# Patient Record
(demographics unavailable — no encounter records)

---

## 2024-10-21 NOTE — HISTORY OF PRESENT ILLNESS
[de-identified] : 10/21/2024: Gil is a pleasant 17-year-old male who presents to the office today with his mother for evaluation of a chronic right elbow injury.  As per the patient's mother the patient injured the elbow jumping off the bed when he was 3 years old.  At that time he was seen at an outside facility and placed into a cast.  He seemed to recover reasonably well after the injury but as time went on and the patient started to grow more they noticed a small deformity of his elbow.  The patient himself does not complain of much pain in the elbow but does have functional limitations, particularly with supination of his forearm.  He does get discomfort from time to time but could do most of what he needs to.  He does not like how the elbow looks as he notices a large bump on the back of the elbow that occasionally bothers him he leans on the elbow.  He did see another orthopedist several years ago we do not recommend any surgery.  He is here today for another opinion.  He has not had any treatment formally in recent years.  The patient denies any fevers, chills, sweats, recent illnesses, numbness, tingling, weakness, or pain elsewhere at this time.

## 2024-10-21 NOTE — PHYSICAL EXAM
[de-identified] : General: Awake, alert, no acute distress, Patient was cooperative and appropriate during the examination.  The patient's weight is normal for height and age.  Walks without an antalgic gait.   Right Elbow Exam: Physical exam of the elbow demonstrates normal skin without signs of skin changes or abnormalities. No erythema, warmth, or joint effusion appreciated.  Gross deformity of the elbow is noted with radial head prominent posteriorly and laterally.  Sensation intact light touch C5-T1 Palpable radial pulse Radial/ulnar/median/axillary/musculocutaneous/AIN/PIN nerves grossly intact  Range of motion: Flexion-Extension 0-145 degrees Pronation-Supination 80 degrees of pronation to 5 degrees of supination with discomfort in terminal supination  Palpation: Not tender to palpation over the lateral epicondyle Not tender palpation over the medial epicondyle Mildly tender to palpation over the radial head which is dislocated posteriorly Not tender to palpation over the olecranon Not tender to palpation over the distal biceps insertion Not tender to palpation over the distal triceps insertion Not tender to palpation over the cubital tunnel  Strength testing: Elbow Flexion 5/5 Elbow Extension 5/5 Pronation 5/5 Supination 5/5  Special Tests: No varus/valgus laxity at 0 and 30 degrees of elbow flexion No pain with resisted wrist/finger extension and forearm supination No pain with resisted wrist/finger flexion and forearm pronation Negative hook test Negative Tinel's over the carpal and cubital tunnels  No oral lesions; no gross abnormalities [de-identified] : X-rays including 3 views of the left elbow were obtained in the office on 10/21/2024 and reviewed with the patient and his mother.  There is no acute fracture.  There is a chronic appearing posterior dislocation of the radial head which is migrated proximally.

## 2024-10-21 NOTE — DISCUSSION/SUMMARY
[de-identified] : Assessment: 17-year-old male with a chronic left posterior radial head dislocation  Plan: I had a long discussion with the patient and his mother today regarding the nature of their diagnosis and treatment plan. We discussed the risks and benefits of no treatment as well as nonoperative and operative treatments.  I reviewed the patient's x-rays today with him and his mother in the office which demonstrate chronic posterior left radial head dislocation which is migrated proximally.  Patient has minimal tenderness palpation over the radial head itself.  He has no tenderness over the forearm or wrist.  He has reasonably well-preserved elbow flexion and extension as well as pronation but lacks supination beyond a near neutral position.  He has no sensations or signs of instability on examination.  There is a noticeable gross deformity about the posterolateral aspect of the elbow.  He does feel this limits him functionally and bothersome to lean on the elbow.  He would like to know if anything could be considered surgically to repair this.  At this time I recommend another opinion with a pediatric orthopedic specialist for possible surgical consultation of his chronic radial head dislocation.  He may resume his normal activities as tolerated.  He may follow-up with me on an as-needed basis.  The patient and his mother verbalized their understanding and agree with the plan.  All questions were answered to their satisfaction.   I, Dr. Ayers, personally performed the evaluation and management (E/M) services for this new patient.  That E/M includes conducting the clinically appropriate initial history &/or exam, assessing all conditions, and establishing the plan of care.  Today, my DEE, was here to observe my evaluation and management service for this patient & follow plan of care established by me going forward.

## 2024-11-04 NOTE — ASSESSMENT
[FreeTextEntry1] : REASON FOR REQUEST: This young man comes today accompanied by his mother regarding the chief complaint of right elbow dislocation of radial head, referred by Dr. Ayers.   HISTORY OF PRESENT ILLNESS: Gil is a 17-year-old young man who is right-hand dominant.  The patient was delivered at 29 weeks' gestation, delivered via  as being a preemie, and was born at birthweight of 2 pounds 5 ounces with a stay in  intensive care unit for approximately three months.  There did not appear to be a congenital dislocation of the elbow at that time.  The patient's mother reports that the only elbow trauma that had been sustained to Gil's elbow was back in , when he was treated nonoperatively for an elbow injury.  The patient's mother had noticed any type of prominence about the elbow, particularly over the radial head.  However, this has become a progressive phenomenon, and at this point, it appears to be slightly cosmetically but also causing pain with limitation of rotation, particularly with supination, as well as with just generalized function of the right upper extremity.  The patient was referred by Dr. Ayers for possible surgical management given the fact he still fits into the category of pediatric orthopedics.   PAST MEDICAL HISTORY:  As above.   PAST SURGICAL HISTORY:  Significant for intestinal perforation with surgical treatment performed on 2007.   ALLERGIES: The child has had no known drug allergy.   MEDICATIONS: The patient does not take any medications.   REVIEW OF SYSTEMS: Today is negative for fever, chills, chest pain, shortness of breath, or rashes.   FAMILY/SOCIAL HISTORY:  The child is in 12th grade.  He has one sibling who is healthy. There are no orthopedic or neurologic conditions that run in the family. The child resides within a tobacco-positive household.   PHYSICAL EXAMINATION: On physical examination today, Gil is in no apparent distress.  Focused examination of his right upper extremity demonstrates a discernible prominence with overgrowth of radial head proximally.  This appears to emanate posterior to the humeral line.  The patient has no limitation in full extension, although he has slightly less extension than the contralateral side.  He has full elbow flexion.  The patient with attempts at pronation and supination, recreates discomfort; 10 degrees of supination, approximately 40 to 50 degrees of pronation with mechanical block to further motion.  There is some evidence of forearm atrophy.  5/5 EPL, EDC, first dorsal interosseous and FDP to the index finger with capillary refill less than 2 seconds.   REVIEW OF IMAGING: X-ray imaging was available for review from Dr. Ayers's office indicating a congenital versus a traumatic dislocation of the humeral head quite some time ago; so there is certainly some chronicity.  The patient has dysmorphic features of the radial head.  He appears to have overgrown by approximately 2.5 cm.  There is still an articulation between the radius and the ulna proximally.  Forearm films and wrist films were not obtained for a full assessment of the forearm, including the DRUJ.   ASSESSMENT/PLAN: Gil is a 17-year-old young man who has an issue involving right elbow radial head dislocation, which is chronic, either post-traumatic versus congenital.  Based on the appearance of the proximal radius, this certainly may have been a post-traumatic event back in .  However, it certainly is amenable to any type of reduction based on overgrowth.  Today, I discussed with Gil's mother, who act as independent historian given the child's pediatric age, the fact that Gil's elbow was an adult elbow with complete closure of the physes.  This would undergo more of an adult treatment pattern, which may involve radial head resection in order to alleviate symptoms of pain.  With respect to the pronation and supination based on the anatomy at the proximal radial and ulnar joint, this already has dysmorphic features and may not necessarily benefit with improved motion.  This would be more or less a pain-alleviating surgery.  However, I provided names of my colleagues, Dr. Pimentel and Dr. Rosa, who are adult specialists who also deal with pediatric carryover problems, which I feel would be more appropriate for management of this with potential for radial head resection. All questions were answered to satisfaction today.  Contact information was provided.  Both Gil and his mother expressed understanding and agree.